# Patient Record
Sex: FEMALE | Race: BLACK OR AFRICAN AMERICAN | NOT HISPANIC OR LATINO | URBAN - METROPOLITAN AREA
[De-identification: names, ages, dates, MRNs, and addresses within clinical notes are randomized per-mention and may not be internally consistent; named-entity substitution may affect disease eponyms.]

---
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
## 2022-12-03 ENCOUNTER — EMERGENCY (EMERGENCY)
Facility: HOSPITAL | Age: 22
LOS: 0 days | Discharge: HOME | End: 2022-12-03
Attending: EMERGENCY MEDICINE | Admitting: EMERGENCY MEDICINE

## 2022-12-03 VITALS
SYSTOLIC BLOOD PRESSURE: 123 MMHG | OXYGEN SATURATION: 100 % | TEMPERATURE: 100 F | HEART RATE: 97 BPM | DIASTOLIC BLOOD PRESSURE: 74 MMHG | RESPIRATION RATE: 20 BRPM | WEIGHT: 119.93 LBS

## 2022-12-03 DIAGNOSIS — Z20.2 CONTACT WITH AND (SUSPECTED) EXPOSURE TO INFECTIONS WITH A PREDOMINANTLY SEXUAL MODE OF TRANSMISSION: ICD-10-CM

## 2022-12-03 LAB — HIV 1 & 2 AB SERPL IA.RAPID: SIGNIFICANT CHANGE UP

## 2022-12-03 PROCEDURE — 99284 EMERGENCY DEPT VISIT MOD MDM: CPT

## 2022-12-03 NOTE — ED PROVIDER NOTE - PATIENT PORTAL LINK FT
You can access the FollowMyHealth Patient Portal offered by [facility] by registering at the following website: [email or web address]. By joining [employer]’s FollowMyHealth portal, you will also be able to view your health information using other applications (apps) compatible with our system.

## 2022-12-03 NOTE — ED PROVIDER NOTE - ATTENDING APP SHARED VISIT CONTRIBUTION OF CARE
22-year-old female presents for STD check.  Patient states her partner had a partial positive on a syphilis screen.  Patient without vaginal discharge, no dysuria

## 2022-12-03 NOTE — ED PROVIDER NOTE - NS ED ATTENDING STATEMENT MOD
Chemo working!
This was a shared visit with the NAVEEN. I reviewed and verified the documentation and independently performed the documented:

## 2022-12-03 NOTE — ED PROVIDER NOTE - OBJECTIVE STATEMENT
22-year-old female, no past medical history, presents to the emergency department for STD check.  Patient states significant other had a partial positive syphilis result.  Patient without symptoms, no other sexual partners. Denies fever, chills, cp, sob, neck pain, visual changes, nvd, dizziness, numbness, tingling.

## 2022-12-05 LAB
C TRACH RRNA SPEC QL NAA+PROBE: SIGNIFICANT CHANGE UP
N GONORRHOEA RRNA SPEC QL NAA+PROBE: SIGNIFICANT CHANGE UP
SPECIMEN SOURCE: SIGNIFICANT CHANGE UP

## 2022-12-07 ENCOUNTER — EMERGENCY (EMERGENCY)
Facility: HOSPITAL | Age: 22
LOS: 0 days | Discharge: HOME | End: 2022-12-07
Attending: EMERGENCY MEDICINE | Admitting: EMERGENCY MEDICINE

## 2022-12-07 VITALS
RESPIRATION RATE: 18 BRPM | HEIGHT: 64 IN | OXYGEN SATURATION: 100 % | SYSTOLIC BLOOD PRESSURE: 126 MMHG | DIASTOLIC BLOOD PRESSURE: 77 MMHG | HEART RATE: 81 BPM | TEMPERATURE: 99 F | WEIGHT: 119.93 LBS

## 2022-12-07 DIAGNOSIS — Z11.3 ENCOUNTER FOR SCREENING FOR INFECTIONS WITH A PREDOMINANTLY SEXUAL MODE OF TRANSMISSION: ICD-10-CM

## 2022-12-07 DIAGNOSIS — A64 UNSPECIFIED SEXUALLY TRANSMITTED DISEASE: ICD-10-CM

## 2022-12-07 LAB
RPR SER-TITR: (no result)
RPR SERPL-ACNC: REACTIVE
T PALLIDUM AB TITR SER: POSITIVE

## 2022-12-07 PROCEDURE — 99284 EMERGENCY DEPT VISIT MOD MDM: CPT

## 2022-12-07 RX ORDER — PENICILLIN G BENZATHINE 1200000 [IU]/2ML
2.4 INJECTION, SUSPENSION INTRAMUSCULAR ONCE
Refills: 0 | Status: COMPLETED | OUTPATIENT
Start: 2022-12-07 | End: 2022-12-07

## 2022-12-07 RX ADMIN — PENICILLIN G BENZATHINE 2.4 MILLION UNIT(S): 1200000 INJECTION, SUSPENSION INTRAMUSCULAR at 14:16

## 2022-12-07 NOTE — ED PROVIDER NOTE - PROGRESS NOTE DETAILS
patient will receive her dose of pencillin. patient has been with her partner on and off for several years. she is not sure when she may have contacted std. due to unknown duration of infection will have patient return for total of three shots.  patient is to return on dec14 for shot number 2

## 2022-12-07 NOTE — ED PROVIDER NOTE - PATIENT PORTAL LINK FT
You can access the FollowMyHealth Patient Portal offered by [facility] by registering at the following website: [email or web address]. By joining [employer]’s FollowMyHealth portal, you will also be able to view your health information using other applications (apps) compatible with our system.

## 2022-12-07 NOTE — ED PROVIDER NOTE - CLINICAL SUMMARY MEDICAL DECISION MAKING FREE TEXT BOX
Patient treated with penicillin.  Due to unknown duration of infection will repeat injection in 1 week.

## 2022-12-07 NOTE — ED PROVIDER NOTE - ATTENDING APP SHARED VISIT CONTRIBUTION OF CARE
22-year-old female presents after was called for positive syphilis screen.  Patient boyfriend also positive.  No symptoms.  On exam patient in NAD, AAOx3, steady gait

## 2022-12-07 NOTE — ED PROVIDER NOTE - PHYSICAL EXAMINATION
--EXAM--  VITAL SIGNS: I have reviewed vs documented at present.  CONSTITUTIONAL: Well-developed; well-nourished; in no acute distress.   SKIN: Warm and dry, no acute rash.

## 2022-12-07 NOTE — ED POST DISCHARGE NOTE - DETAILS
Incorrect phone number in chart is soley mother's phone number. Pt's phone number is [phone] Informed pt of need to return to ID for Bicillin 2.4 million units which could be once weekly for 3 wks and and [facility] clinic f/u CALLED PATIENT AND GAVE ID/VIROLOGY CLINIC TELE TO F/U WITH AFTER POS. SYPHILIS RESULTS. ANSWERED ALL QUESTIONS. SHE WILL CALL TODAY TO MAKE APPT.

## 2022-12-07 NOTE — ED PROVIDER NOTE - NS ED ATTENDING STATEMENT MOD
This was a shared visit with the NAVEEN. I reviewed and verified the documentation and independently performed the documented:

## 2022-12-07 NOTE — ED PROVIDER NOTE - OBJECTIVE STATEMENT
21 yo female presents to ed for evaluation. patient came in for std testing on dec 3 and was callback today to return for treatment . patient boyfriend is positive . patient has no symptoms

## 2022-12-14 ENCOUNTER — EMERGENCY (EMERGENCY)
Facility: HOSPITAL | Age: 22
LOS: 0 days | Discharge: HOME | End: 2022-12-14
Attending: EMERGENCY MEDICINE | Admitting: EMERGENCY MEDICINE

## 2022-12-14 VITALS
HEART RATE: 89 BPM | HEIGHT: 64 IN | RESPIRATION RATE: 18 BRPM | WEIGHT: 119.93 LBS | TEMPERATURE: 99 F | DIASTOLIC BLOOD PRESSURE: 85 MMHG | OXYGEN SATURATION: 99 % | SYSTOLIC BLOOD PRESSURE: 127 MMHG

## 2022-12-14 DIAGNOSIS — A64 UNSPECIFIED SEXUALLY TRANSMITTED DISEASE: ICD-10-CM

## 2022-12-14 PROCEDURE — 99284 EMERGENCY DEPT VISIT MOD MDM: CPT

## 2022-12-14 RX ORDER — PENICILLIN G BENZATHINE 1200000 [IU]/2ML
2.4 INJECTION, SUSPENSION INTRAMUSCULAR ONCE
Refills: 0 | Status: COMPLETED | OUTPATIENT
Start: 2022-12-14 | End: 2022-12-14

## 2022-12-14 RX ADMIN — PENICILLIN G BENZATHINE 2.4 MILLION UNIT(S): 1200000 INJECTION, SUSPENSION INTRAMUSCULAR at 22:46

## 2022-12-14 NOTE — ED ADULT NURSE NOTE - NSIMPLEMENTINTERV_GEN_ALL_ED
Implemented All Universal Safety Interventions:  Veteran to call system. Call bell, personal items and telephone within reach. Instruct patient to call for assistance. Room bathroom lighting operational. Non-slip footwear when patient is off stretcher. Physically safe environment: no spills, clutter or unnecessary equipment. Stretcher in lowest position, wheels locked, appropriate side rails in place.

## 2022-12-14 NOTE — ED PROVIDER NOTE - NS ED ATTENDING STATEMENT MOD
This was a shared visit with the ANVEEN. I reviewed and verified the documentation and independently performed the documented:

## 2022-12-14 NOTE — ED PROVIDER NOTE - PATIENT PORTAL LINK FT
You can access the FollowMyHealth Patient Portal offered by [facility] by registering at the following website: [email or web address]. By joining [employer]’s FollowMyHealth portal, you will also be able to view your health information using other applications (apps) compatible with our system.

## 2022-12-14 NOTE — ED PROVIDER NOTE - PHYSICAL EXAMINATION
--EXAM--  VITAL SIGNS: I have reviewed vs documented at present.  CONSTITUTIONAL: Well-developed; well-nourished; in no acute distress.

## 2022-12-14 NOTE — ED PROVIDER NOTE - ATTENDING APP SHARED VISIT CONTRIBUTION OF CARE
22-year-old female second penicillin shot.  Patient is currently being treated for syphilis of unknown duration.  Patient currently without nausea vomiting chest pain or shortness of breath.  On exam patient in NAD, AAO x3, seen ambulate with steady gait

## 2022-12-21 ENCOUNTER — EMERGENCY (EMERGENCY)
Facility: HOSPITAL | Age: 22
LOS: 0 days | Discharge: HOME | End: 2022-12-21
Attending: EMERGENCY MEDICINE | Admitting: EMERGENCY MEDICINE

## 2022-12-21 VITALS
TEMPERATURE: 98 F | RESPIRATION RATE: 18 BRPM | WEIGHT: 119.93 LBS | DIASTOLIC BLOOD PRESSURE: 80 MMHG | SYSTOLIC BLOOD PRESSURE: 125 MMHG | HEART RATE: 78 BPM | HEIGHT: 64 IN | OXYGEN SATURATION: 99 %

## 2022-12-21 DIAGNOSIS — A53.9 SYPHILIS, UNSPECIFIED: ICD-10-CM

## 2022-12-21 PROCEDURE — 99283 EMERGENCY DEPT VISIT LOW MDM: CPT

## 2022-12-21 RX ORDER — PENICILLIN G BENZATHINE 1200000 [IU]/2ML
2.4 INJECTION, SUSPENSION INTRAMUSCULAR ONCE
Refills: 0 | Status: COMPLETED | OUTPATIENT
Start: 2022-12-21 | End: 2022-12-21

## 2022-12-21 RX ADMIN — PENICILLIN G BENZATHINE 2.4 MILLION UNIT(S): 1200000 INJECTION, SUSPENSION INTRAMUSCULAR at 20:20

## 2022-12-21 NOTE — ED PROVIDER NOTE - CLINICAL SUMMARY MEDICAL DECISION MAKING FREE TEXT BOX
Third dose of medication given, has no current complaints.     Will dc with ID follow up, return precautions.

## 2022-12-21 NOTE — ED PROVIDER NOTE - PHYSICAL EXAMINATION
VITAL SIGNS: I have reviewed nursing notes and confirm.  CONSTITUTIONAL: Well-developed; well-nourished; in no acute distress.  SKIN: Skin exam is warm and dry, no acute rash.  HEAD: Normocephalic; atraumatic.  EYES: PERRL, EOM intact  CARD: S1, S2 normal; no murmurs, gallops, or rubs. Regular rate and rhythm.  RESP: No wheezes, rales or rhonchi.

## 2022-12-21 NOTE — ED PROVIDER NOTE - NSFOLLOWUPINSTRUCTIONS_ED_ALL_ED_FT
You have completed your course of treatment. Please continue to practice safe sex using barrier contraception.    If you develop rash, fever, discharge or any other conditions, please return to the ER immediately.      Syphilis      Syphilis is an infection that can spread through sexual contact. The infection can cause serious complications, so it is important to get treatment right away.    There are four stages of syphilis:  •Primary stage. During this stage sores may form where the disease entered your body.      •Secondary stage. During this stage skin rashes and lesions will form.      •Latent stage. During this stage there are no symptoms, but the infection may still be contagious.      •Tertiary stage. This stage happens 10–30 years after the infection starts. During this stage, the disease damages organs and can lead to death. Most people do not develop this stage of syphilis.        What are the causes?    This condition is caused by bacteria called Treponema pallidum. The condition can spread during sexual activity, such as during oral, anal, or vaginal sex. It can also be spread from mother to fetus during pregnancy.      What increases the risk?    You are more likely to develop this condition if:  •You do not use a condom.      •You have or had another sexually transmitted infection (STI).      •You have multiple sex partners.      •You use illegal drugs through an IV.        What are the signs or symptoms?    Symptoms of this condition depend on the stage of the disease.    Primary stage     •Painless sores (chancres) in and around the genital organs, mouth, or hands. The sores are usually firm and round.      Secondary stage     •A rash or sores. The rash usually does not itch.      •A fever.      •A headache.      •A sore throat.      •Swollen lymph nodes.      •New sores in the mouth or on the genitals.      •A feeling of being ill.      •Pain in the joints.      •Patchy hair loss.      •Weight loss.      •Fatigue.      Latent stage     There are no symptoms during this stage.    Tertiary stage     •Dementia.      •Personality and mood changes.      •Difficulty walking and coordinating movements.      •Muscle weakness or paralysis.      •Problems with coordination.      •Heart failure.      •Trouble breathing.      •Fainting.      •Soft, rubbery growths on the skin, bones, or liver (gummas).      •Sudden "lightning" pains, numbness, or tingling.      •Vision changes.      •Hearing changes.      •Trouble controlling your urine and bowel movements.        How is this diagnosed?     This condition is diagnosed with:  •A physical exam.      •Blood tests.      •Tests of the fluid (drainage) from a sore or rash.      •Tests of the fluid around the spine (lumbar puncture). These tests are done to check for an infection in the brain or nervous system.    •Imaging tests. These may be done to check for damage to the heart, aorta, or brain if the condition is in the tertiary stage. Tests may include:  •An X-ray.      •A CT scan.      •An MRI.      •An echocardiogram. This test takes a picture of the heart.      •An ultrasound.          How is this treated?    This condition can be cured with antibiotic medicine. During the first day of treatment, the medicine may cause you to experience fever, chills, headache, nausea, or aching all over your body. This is normal and should go away within 24 hours.      Follow these instructions at home:      Medicines      •Take over-the-counter and prescription medicines only as told by your health care provider.      •Take your antibiotic medicine as told by your health care provider. Do not stop taking the antibiotic even if you start to feel better. Incomplete treatment will put you at risk for continued infection and could be life threatening.      General instructions     • Do not have sex until your treatment is completed, or as directed by your health care provider.      •Tell your recent sexual partners that you were diagnosed with syphilis. It is important that they get treatment, even if they do not have symptoms.      •Keep all follow-up visits as told by your health care provider. This is important.        How is this prevented?    •Use latex condoms correctly whenever you have sex.      •Before you have sex, ask your partner if he or she has been tested for STIs. Ask about the test results.      •Avoid having multiple sexual partners.        Contact a health care provider if:  •You continue to have any of the following symptoms 24 hours after beginning treatment:  •Fever.      •Chills.      •Headache.      •Nausea.      •Aching all over your body.        •Your symptoms do not improve, even with treatment.        Get help right away if:    •You have severe chest pain.      •You have trouble walking or coordinating movements.      •You are confused.      •You lose vision or hearing.      •You have numbness in your arms or legs.      •You have a seizure.      •You faint.      •You have a severe headache that does not go away with medicine.        Summary    •Syphilis is an infection that can spread through sexual contact or from mother to fetus during pregnancy.      •This condition can cause serious complications, so it is best to get treatment right away. The condition can be cured with antibiotic medicine.      •Take your antibiotic medicine as told by your health care provider.      •Tell your recent sexual partners that you were diagnosed with syphilis. It is important that they get treatment, even if they do not have symptoms.      This information is not intended to replace advice given to you by your health care provider. Make sure you discuss any questions you have with your health care provider.

## 2022-12-21 NOTE — ED PROVIDER NOTE - OBJECTIVE STATEMENT
23 yo F, recently diagnosed with syphilis currently being treated with IM Bicillin here for last dose, no somatic complaints, tolerated previous injections well.

## 2022-12-21 NOTE — ED PROVIDER NOTE - NSFOLLOWUPCLINICS_GEN_ALL_ED_FT
[facility]  Infectious Diseases  [address] [place] [address]  Phone: [phone]  Fax: [phone]  Follow Up Time: Routine

## 2022-12-21 NOTE — ED PROVIDER NOTE - NS ED ROS FT
Constitutional: no fever, chills, no recent weight loss, change in appetite or malaise  Cardiac: No chest pain, SOB or edema.  Respiratory: No cough or respiratory distress  GI: No nausea, vomiting, diarrhea or abdominal pain.  Skin: No skin rash.  Except as documented in the HPI, all other systems are negative.

## 2022-12-21 NOTE — ED PROVIDER NOTE - PATIENT PORTAL LINK FT
You can access the FollowMyHealth Patient Portal offered by [facility] by registering at the following website: [email or web address]. By joining [employer]’s FollowMyHealth portal, you will also be able to view your health information using other applications (apps) compatible with our system.

## 2023-11-17 NOTE — ED PROVIDER NOTE - NS ED ATTENDING STATEMENT MOD
[facility]  Physical Therapy Initial Evaluation    Date: 11/17/2023   Name: Vince Martinez  Clinic Number: [id number]    Therapy Diagnosis:   Encounter Diagnoses   Name Primary?    Failed back syndrome     Chronic midline low back pain with bilateral sciatica Yes     Physician: Randy Riggs MD    Physician Orders: PT Eval and Treat   Medical Diagnosis from Referral: M96.1 (ICD-10-CM) - Failed back syndrome   Evaluation Date: 11/17/2023  Authorization Period Expiration: 12/31/2023  Plan of Care Expiration: 12/29/2023  Visit # / Visits authorized: 1/1    Time In: 9:05 am  Time Out: 10:00 am  Total Appointment Time (timed & untimed codes): 55 minutes (1 MCE) (1 TE) (1 TA)     Precautions: Standard; CHF; diabetes  No interpretor necessary     Subjective   Date of onset: ~ 2-3 years   History of current condition - Vince reports: he has been having low back pain for approximately 2-3 years. Patient reports having a surgery about 50 years ago on his low back followed by a laminectomy with spacer at L4/L5 on 5/26/2021. Reports pain will typically radiate down bilateral lower extremity in the night time, but not past his knees. Aggravating factors include night time, walking, and bending over to pick something up. Easing factors include resting; although, patient has difficulty attributing any easing factors. Patient denies any saddle paresthesia, recent balance deficits, or bowel/bladder incontinence.     Recent falls: 0    Imaging: see EMR     Medical History:   Past Medical History:   Diagnosis Date    Acute combined systolic and diastolic CHF, NYHA class 3 11/15/2018    Arthritis     Bilateral renal cysts     Chronic pain     Diabetes mellitus     DJD (degenerative joint disease)     Hyperkalemia     Hypertension     Iron deficiency anemia     Low back pain     Osteopenia of neck of right femur     Prostate enlargement     Sleep apnea     Stage 3b chronic kidney disease     Thyroid disease         Surgical History:   Vince Martinez  has a past surgical history that includes Injection of joint (Bilateral, 02/13/2020); Epidural steroid injection into lumbar spine (N/A, 06/04/2020); Cardiac pacemaker placement; Injection of steroid (Bilateral, 01/12/2021); Transforaminal epidural injection of steroid (Left, 02/23/2021); Hemilaminectomy (05/26/2021); Spine surgery; Injection of joint (Bilateral, 03/22/2022); and Spine surgery (Bilateral).    Medications:   Vince has a current medication list which includes the following prescription(s): aspirin, atorvastatin, betamethasone dipropionate, blood sugar diagnostic, blood-glucose meter, calcium/d3/mag ox//carolyn/zn, diclofenac sodium, famotidine, finasteride, fluticasone propionate, fluticasone-salmeterol 250-50 mcg/dose, folic acid, folic acid/multivit-min/lutein, furosemide, ipratropium, lancets, lancing device, levothyroxine, melatonin, fish oil-omega-3 fatty acids, patiromer calcium sorbitex, senna, sitagliptan-metformin, tamsulosin, trazodone, and triamcinolone acetonide 0.1%.    Allergies:   Review of patient's allergies indicates:   Allergen Reactions    Bactrim [sulfamethoxazole-trimethoprim] Rash    Ciprofloxacin Rash    Latex Rash        Prior Therapy: yes, for his low back in the past but it did not help much  Social History: lives with his wife and son   Occupation: retired   Prior Level of Function: progressive worsening the last 3 years  Current Level of Function: currently not lifting > 20 pounds; limiting activity     Pain:  Current: 4/10; Worst: 9/10; Best: 0/10 (with tylenol)  Location: low back  Description: aching, dull, sharp at night time  Aggravating Factors: see above  Easing Factors: see above     Pt's goals:   Patient would like to learn how to manage his low back pain.     Objective   Observation: no assistive device; pleasant     DTR:   Right Left   Patellar (L3-4) 2+ 2+   Achilles (S1) 2+ 2+     Lumbar Range of Motion:    Degrees  "Pain   Flexion 60 degrees    Minor low back pain   Master's sign ( - )   Extension  10 degrees    Sharp low back pain   Left Side Bending  75%  Minor low back pain   Right Side Bending  75%  Minor low back pain   Left rotation    75%  Sharp right sided low back pain   Right Rotation    75%  Minor low back pain        Hip Range of Motion:   Right Passive Left Passive   External Rotation  28 degrees  28 degrees   Internal Rotation  25 degrees  20 degrees   Flexion  110 degrees  105 degrees       Lower Extremity Strength  Right LE  Left LE    Knee extension:  9.6 kg Knee extension:  13.1 kg   Knee flexion:  11.8 kg Knee flexion:  8.0 kg   Hip flexion: 4-/5 - LBP Hip flexion: 3+/5 - LBP   Hip extension:  4-/5 Hip extension: 4-/5   Hip abduction: 4-/5 Hip abduction: 4-/5       Special Tests:  - Flexion preference: yes  - Extension preference: no  - SLR test: (+)  - Contralateral straight leg raise test: (+)     Joint Mobility: hypomobility and tenderness of the lower lumbar spine     Palpation: lumbar erectors tender to palpation     Sensation: WNL    Flexibility: hip and lumbar flexibility impairments       Limitation/Restriction for FOTO Lumbar Survey    Therapist reviewed FOTO scores for Vince Martinez on 11/17/2023.   FOTO documents entered into [employer] - see Media section.    Limitation Score: 52%  Predicted Limitation Score: 48%         TREATMENT   Treatment Time In: 9:35 am  Treatment Time Out: 10:00 am  Total Treatment time (time-based codes) separate from Evaluation: 25 minutes    Vince received therapeutic exercises to develop strength, endurance, ROM, flexibility, posture, and core stabilization for 15 minutes including:  LTR: 15x   SKTC: 5x10"  Bridges: 10x  Seated swiss ball rollouts: 10x  - patient reported pain level increase from 4/10 to 6/10 following above exercises    Seated hip abduction isometric: 5"x10    therapeutic activities to improve functional performance for 10 minutes, including:  Patient " Attending Only education on spine anatomy, plan of care, prognosis, and home exercise program     Home Exercises and Patient Education Provided    Education provided:   - HEP  - Prognosis/POC  - Pain science    Written Home Exercises Provided: yes.  Exercises were reviewed and Vince was able to demonstrate them prior to the end of the session.  Vince demonstrated good  understanding of the education provided.     See EMR under Patient Instructions for exercises provided 11/17/2023.    Assessment   Vince is a 89 y.o. male referred to outpatient Physical Therapy with a medical diagnosis of failed back syndrome. Patient has signs and symptoms presenting as low back pain due to strength and endurance deficits. Patient presenting without directional preference and primary objective limitations include lower extremity strength deficits. Patient is displaying weakness in bilateral quadriceps and hamstrings along with strength/endurance deficits of global lower extremity hip musculature. Patient would benefit from skilled PT focusing on a lower extremity strength and mobility program with graded exposure to lumbar load and lifting.     Pt to be seen 2x/week for 6 weeks     Pt prognosis is Good.   Pt will benefit from skilled outpatient Physical Therapy to address the deficits stated above and in the chart below, provide pt/family education, and to maximize pt's level of independence.     Plan of care discussed with patient: Yes  Pt's spiritual, cultural and educational needs considered and patient is agreeable to the plan of care and goals as stated below:     Anticipated Barriers for therapy: age     Medical Necessity is demonstrated by the following  History  Co-morbidities and personal factors that may impact the plan of care Co-morbidities:   advanced age, CHF, CKD stage III, diabetes, and prior lumbar surgery    Personal Factors:   age  coping style     high   Examination  Body Structures and Functions, activity limitations and  participation restrictions that may impact the plan of care Body Regions:   back  lower extremities    Body Systems:    gross symmetry  ROM  strength  gross coordinated movement  balance  gait  transfers  transitions  motor control  motor learning    Participation Restrictions:   Community ambulation   Shopping   Cleaning   Chores    Activity limitations:   Learning and applying knowledge  no deficits    General Tasks and Commands  no deficits    Communication  no deficits    Mobility  lifting and carrying objects  walking    Self care  no deficits    Domestic Life  shopping  doing house work (cleaning house, washing dishes, laundry)  assisting others    Interactions/Relationships  no deficits    Life Areas  no deficits    Community and Social Life  no deficits         high   Clinical Presentation evolving clinical presentation with changing clinical characteristics moderate   Decision Making/ Complexity Score: moderate     Goals:  Short Term Goals: 4 weeks  1. Patient will be independent with HEP in order to supplement pain free lumbar ROM - PROGRESSING, NOT MET  2. Pt will improve hip flexibility to WNL to promote functional mobility - PROGRESSING, NOT MET  3. Patient will display the ability to perform an isometric transverse abdominis isometric contraction in supine for improved trunk stability - PROGRESSING, NOT MET      Long Term Goals: 8 weeks   1. Pt will improve lumbar FOTO survey to </=48% limited in order to return to ADLs without limitation - PROGRESSING, NOT MET  2. Patient will improve hip flexion, abduction, and extension strength to a 5/5 bilaterally for improved trunk support. - PROGRESSING, NOT MET  3. Pt will report no pain during lumbar AROM in order to promote functional mobility - PROGRESSING, NOT MET  4. Patient will display the ability and understanding of performing a proper hip hinge with quality movement coordination for lifting mechanics. - PROGRESSING, NOT MET     Plan   Plan of care  Certification: 11/17/2023 to 12/29/2023.    Outpatient Physical Therapy 2 times weekly for 6 weeks to include the following interventions: Cervical/Lumbar Traction, Gait Training, Manual Therapy, Moist Heat/ Ice, Neuromuscular Re-ed, Patient Education, Self Care, Therapeutic Activities, and Therapeutic Exercise.     Kar Vargas, PT

## 2025-03-18 NOTE — ASU PATIENT PROFILE, PEDIATRIC - PRO MENTAL HEALTH SX RECENT
03/18/25 2:01 PM     Chart reviewed for Blood Pressure was/were not submitted to the patient's insurance.     Radha Swartz MA   [facility]  
none